# Patient Record
Sex: FEMALE | Race: OTHER | NOT HISPANIC OR LATINO
[De-identification: names, ages, dates, MRNs, and addresses within clinical notes are randomized per-mention and may not be internally consistent; named-entity substitution may affect disease eponyms.]

---

## 2017-01-18 ENCOUNTER — APPOINTMENT (OUTPATIENT)
Dept: ORTHOPEDIC SURGERY | Facility: CLINIC | Age: 57
End: 2017-01-18

## 2017-03-08 ENCOUNTER — APPOINTMENT (OUTPATIENT)
Dept: ORTHOPEDIC SURGERY | Facility: CLINIC | Age: 57
End: 2017-03-08

## 2017-03-08 VITALS
DIASTOLIC BLOOD PRESSURE: 80 MMHG | HEIGHT: 63 IN | HEART RATE: 62 BPM | SYSTOLIC BLOOD PRESSURE: 144 MMHG | BODY MASS INDEX: 34.2 KG/M2 | WEIGHT: 193 LBS

## 2017-03-08 DIAGNOSIS — Z98.890 OTHER SPECIFIED POSTPROCEDURAL STATES: ICD-10-CM

## 2017-04-10 ENCOUNTER — APPOINTMENT (OUTPATIENT)
Dept: INTERNAL MEDICINE | Facility: CLINIC | Age: 57
End: 2017-04-10

## 2017-04-12 ENCOUNTER — APPOINTMENT (OUTPATIENT)
Dept: INTERNAL MEDICINE | Facility: CLINIC | Age: 57
End: 2017-04-12

## 2017-04-12 VITALS
DIASTOLIC BLOOD PRESSURE: 85 MMHG | BODY MASS INDEX: 34.38 KG/M2 | WEIGHT: 194 LBS | SYSTOLIC BLOOD PRESSURE: 134 MMHG | OXYGEN SATURATION: 97 % | HEART RATE: 69 BPM | RESPIRATION RATE: 12 BRPM | HEIGHT: 63 IN | TEMPERATURE: 98.1 F

## 2018-01-23 ENCOUNTER — APPOINTMENT (OUTPATIENT)
Dept: OBGYN | Facility: CLINIC | Age: 58
End: 2018-01-23
Payer: COMMERCIAL

## 2018-01-23 PROCEDURE — 99243 OFF/OP CNSLTJ NEW/EST LOW 30: CPT

## 2018-02-08 ENCOUNTER — OUTPATIENT (OUTPATIENT)
Dept: OUTPATIENT SERVICES | Facility: HOSPITAL | Age: 58
LOS: 1 days | End: 2018-02-08
Payer: COMMERCIAL

## 2018-02-08 VITALS
OXYGEN SATURATION: 96 % | RESPIRATION RATE: 19 BRPM | HEIGHT: 64 IN | SYSTOLIC BLOOD PRESSURE: 120 MMHG | HEART RATE: 65 BPM | TEMPERATURE: 98 F | DIASTOLIC BLOOD PRESSURE: 70 MMHG | WEIGHT: 195.11 LBS

## 2018-02-08 DIAGNOSIS — N60.02 SOLITARY CYST OF LEFT BREAST: Chronic | ICD-10-CM

## 2018-02-08 DIAGNOSIS — N84.0 POLYP OF CORPUS UTERI: ICD-10-CM

## 2018-02-08 DIAGNOSIS — Z98.890 OTHER SPECIFIED POSTPROCEDURAL STATES: Chronic | ICD-10-CM

## 2018-02-08 DIAGNOSIS — N84.1 POLYP OF CERVIX UTERI: ICD-10-CM

## 2018-02-08 DIAGNOSIS — Z01.818 ENCOUNTER FOR OTHER PREPROCEDURAL EXAMINATION: ICD-10-CM

## 2018-02-08 PROCEDURE — 80048 BASIC METABOLIC PNL TOTAL CA: CPT

## 2018-02-08 PROCEDURE — 85027 COMPLETE CBC AUTOMATED: CPT

## 2018-02-08 PROCEDURE — G0463: CPT

## 2018-02-08 RX ORDER — CELECOXIB 200 MG/1
200 CAPSULE ORAL ONCE
Qty: 0 | Refills: 0 | Status: DISCONTINUED | OUTPATIENT
Start: 2018-02-16 | End: 2018-03-03

## 2018-02-08 RX ORDER — ACETAMINOPHEN 500 MG
1000 TABLET ORAL ONCE
Qty: 0 | Refills: 0 | Status: COMPLETED | OUTPATIENT
Start: 2018-02-16 | End: 2018-02-16

## 2018-02-08 RX ORDER — LIDOCAINE HCL 20 MG/ML
0.2 VIAL (ML) INJECTION ONCE
Qty: 0 | Refills: 0 | Status: DISCONTINUED | OUTPATIENT
Start: 2018-02-16 | End: 2018-03-03

## 2018-02-08 RX ORDER — SODIUM CHLORIDE 9 MG/ML
3 INJECTION INTRAMUSCULAR; INTRAVENOUS; SUBCUTANEOUS EVERY 8 HOURS
Qty: 0 | Refills: 0 | Status: DISCONTINUED | OUTPATIENT
Start: 2018-02-16 | End: 2018-03-03

## 2018-02-08 NOTE — H&P PST ADULT - PSH
Breast cyst, left  1987  History of breast biopsy  left, 1996  History of shoulder surgery  Rt Rotator 11/2016

## 2018-02-08 NOTE — H&P PST ADULT - HISTORY OF PRESENT ILLNESS
57yr old female with menses and possible polyp of uterus  coming in for D&C Dx hysteroscopy possible operative  hysteroscopy

## 2018-02-08 NOTE — H&P PST ADULT - NSANTHOSAYNRD_GEN_A_CORE
No. BRAD screening performed.  STOP BANG Legend: 0-2 = LOW Risk; 3-4 = INTERMEDIATE Risk; 5-8 = HIGH Risk

## 2018-02-16 ENCOUNTER — OUTPATIENT (OUTPATIENT)
Dept: OUTPATIENT SERVICES | Facility: HOSPITAL | Age: 58
LOS: 1 days | End: 2018-02-16
Payer: COMMERCIAL

## 2018-02-16 ENCOUNTER — APPOINTMENT (OUTPATIENT)
Dept: OBGYN | Facility: HOSPITAL | Age: 58
End: 2018-02-16

## 2018-02-16 ENCOUNTER — TRANSCRIPTION ENCOUNTER (OUTPATIENT)
Age: 58
End: 2018-02-16

## 2018-02-16 ENCOUNTER — RESULT REVIEW (OUTPATIENT)
Age: 58
End: 2018-02-16

## 2018-02-16 VITALS
OXYGEN SATURATION: 99 % | HEART RATE: 64 BPM | HEIGHT: 64 IN | WEIGHT: 195.11 LBS | TEMPERATURE: 98 F | SYSTOLIC BLOOD PRESSURE: 145 MMHG | DIASTOLIC BLOOD PRESSURE: 86 MMHG | RESPIRATION RATE: 16 BRPM

## 2018-02-16 VITALS
SYSTOLIC BLOOD PRESSURE: 149 MMHG | RESPIRATION RATE: 16 BRPM | HEART RATE: 60 BPM | TEMPERATURE: 97 F | DIASTOLIC BLOOD PRESSURE: 65 MMHG | OXYGEN SATURATION: 98 %

## 2018-02-16 DIAGNOSIS — N60.02 SOLITARY CYST OF LEFT BREAST: Chronic | ICD-10-CM

## 2018-02-16 DIAGNOSIS — N84.0 POLYP OF CORPUS UTERI: ICD-10-CM

## 2018-02-16 DIAGNOSIS — Z98.890 OTHER SPECIFIED POSTPROCEDURAL STATES: Chronic | ICD-10-CM

## 2018-02-16 PROCEDURE — 88305 TISSUE EXAM BY PATHOLOGIST: CPT

## 2018-02-16 PROCEDURE — 88305 TISSUE EXAM BY PATHOLOGIST: CPT | Mod: 26

## 2018-02-16 PROCEDURE — 58558 HYSTEROSCOPY BIOPSY: CPT

## 2018-02-16 PROCEDURE — 58558 HYSTEROSCOPY BIOPSY: CPT | Mod: 80

## 2018-02-16 RX ORDER — SODIUM CHLORIDE 9 MG/ML
1000 INJECTION, SOLUTION INTRAVENOUS
Qty: 0 | Refills: 0 | Status: DISCONTINUED | OUTPATIENT
Start: 2018-02-16 | End: 2018-03-03

## 2018-02-16 RX ORDER — ACETAMINOPHEN 500 MG
2 TABLET ORAL
Qty: 0 | Refills: 0 | COMMUNITY

## 2018-02-16 RX ORDER — OXYCODONE HYDROCHLORIDE 5 MG/1
5 TABLET ORAL ONCE
Qty: 0 | Refills: 0 | Status: DISCONTINUED | OUTPATIENT
Start: 2018-02-16 | End: 2018-02-16

## 2018-02-16 RX ORDER — IBUPROFEN 200 MG
1 TABLET ORAL
Qty: 0 | Refills: 0 | COMMUNITY

## 2018-02-16 RX ORDER — ONDANSETRON 8 MG/1
4 TABLET, FILM COATED ORAL ONCE
Qty: 0 | Refills: 0 | Status: DISCONTINUED | OUTPATIENT
Start: 2018-02-16 | End: 2018-03-03

## 2018-02-16 RX ORDER — AMLODIPINE BESYLATE 2.5 MG/1
7.5 TABLET ORAL
Qty: 0 | Refills: 0 | COMMUNITY

## 2018-02-16 RX ORDER — CELECOXIB 200 MG/1
200 CAPSULE ORAL ONCE
Qty: 0 | Refills: 0 | Status: COMPLETED | OUTPATIENT
Start: 2018-02-16 | End: 2018-02-16

## 2018-02-16 RX ORDER — FAMOTIDINE 10 MG/ML
0 INJECTION INTRAVENOUS
Qty: 0 | Refills: 0 | COMMUNITY

## 2018-02-16 RX ADMIN — Medication 1000 MILLIGRAM(S): at 10:16

## 2018-02-16 RX ADMIN — CELECOXIB 200 MILLIGRAM(S): 200 CAPSULE ORAL at 10:17

## 2018-02-16 NOTE — BRIEF OPERATIVE NOTE - PROCEDURE
<<-----Click on this checkbox to enter Procedure Diagnostic hysteroscopy with dilation and curettage of uterus  02/16/2018    Active  [id number]

## 2018-02-16 NOTE — ASU PATIENT PROFILE, ADULT - VISION (WITH CORRECTIVE LENSES IF THE PATIENT USUALLY WEARS THEM):
Reading glasses left at home/Partially impaired: cannot see medication labels or newsprint, but can see obstacles in path, and the surrounding layout; can count fingers at arm's length

## 2018-02-16 NOTE — ASU DISCHARGE PLAN (ADULT/PEDIATRIC). - NOTIFY
GYN Fever>100.4/Bleeding that does not stop Pain not relieved by Medications/Unable to Urinate/Bleeding that does not stop/GYN Fever>100.4/Persistent Nausea and Vomiting

## 2018-02-16 NOTE — ASU DISCHARGE PLAN (ADULT/PEDIATRIC). - ACTIVITY LEVEL
no exercise/for the next 2 weeks/no intercourse/no tampons/no tub baths/no douching/nothing per vagina

## 2018-02-16 NOTE — ASU PREOP CHECKLIST - TO WHOM
OR RN JACEK Sanchez )/( OR CHRIS Sanchez(1100)/ OR RN JACEK Sanchez(1100)/ given by Loly Epstein, RN

## 2018-02-16 NOTE — ASU DISCHARGE PLAN (ADULT/PEDIATRIC). - MEDICATION SUMMARY - MEDICATIONS TO TAKE
I will START or STAY ON the medications listed below when I get home from the hospital:    Motrin 600 mg oral tablet  -- 1 tab(s) by mouth every 6 hours  -- Indication: For for pain/cramping, as needed    Tylenol 325 mg oral tablet  -- 2 tab(s) by mouth every 4 hours  -- Indication: For for pain/cramping, as needed    amLODIPine  -- 7.5 milligram(s) by mouth once a day  -- Indication: For home med    multivitamin  --     -- Indication: For home med    Vitamin D3  --  by mouth   -- Indication: For home med

## 2018-02-20 LAB — SURGICAL PATHOLOGY STUDY: SIGNIFICANT CHANGE UP

## 2018-02-27 ENCOUNTER — APPOINTMENT (OUTPATIENT)
Dept: OBGYN | Facility: CLINIC | Age: 58
End: 2018-02-27
Payer: COMMERCIAL

## 2018-02-27 PROCEDURE — 99024 POSTOP FOLLOW-UP VISIT: CPT

## 2018-05-09 ENCOUNTER — APPOINTMENT (OUTPATIENT)
Dept: INTERNAL MEDICINE | Facility: CLINIC | Age: 58
End: 2018-05-09
Payer: COMMERCIAL

## 2018-05-09 VITALS
RESPIRATION RATE: 12 BRPM | OXYGEN SATURATION: 97 % | DIASTOLIC BLOOD PRESSURE: 76 MMHG | HEART RATE: 70 BPM | TEMPERATURE: 98.1 F | BODY MASS INDEX: 35.26 KG/M2 | WEIGHT: 199 LBS | HEIGHT: 63 IN | SYSTOLIC BLOOD PRESSURE: 152 MMHG

## 2018-05-09 VITALS — DIASTOLIC BLOOD PRESSURE: 82 MMHG | SYSTOLIC BLOOD PRESSURE: 148 MMHG

## 2018-05-09 PROCEDURE — 99396 PREV VISIT EST AGE 40-64: CPT

## 2018-05-14 LAB
25(OH)D3 SERPL-MCNC: 33 NG/ML
ALBUMIN SERPL ELPH-MCNC: 4.3 G/DL
ALP BLD-CCNC: 55 U/L
ALT SERPL-CCNC: 11 U/L
ANION GAP SERPL CALC-SCNC: 15 MMOL/L
APPEARANCE: CLEAR
AST SERPL-CCNC: 17 U/L
BASOPHILS # BLD AUTO: 0.05 K/UL
BASOPHILS NFR BLD AUTO: 0.6 %
BILIRUB SERPL-MCNC: 0.4 MG/DL
BILIRUBIN URINE: NEGATIVE
BLOOD URINE: NEGATIVE
BUN SERPL-MCNC: 14 MG/DL
CALCIUM SERPL-MCNC: 9.3 MG/DL
CHLORIDE SERPL-SCNC: 100 MMOL/L
CO2 SERPL-SCNC: 22 MMOL/L
COLOR: YELLOW
CREAT SERPL-MCNC: 0.64 MG/DL
EOSINOPHIL # BLD AUTO: 0.27 K/UL
EOSINOPHIL NFR BLD AUTO: 3.1 %
GLUCOSE QUALITATIVE U: NEGATIVE MG/DL
GLUCOSE SERPL-MCNC: 74 MG/DL
HBA1C MFR BLD HPLC: 5.6 %
HCT VFR BLD CALC: 40.1 %
HGB BLD-MCNC: 12.9 G/DL
IMM GRANULOCYTES NFR BLD AUTO: 0.1 %
KETONES URINE: NEGATIVE
LEUKOCYTE ESTERASE URINE: NEGATIVE
LYMPHOCYTES # BLD AUTO: 1.95 K/UL
LYMPHOCYTES NFR BLD AUTO: 22.4 %
MAN DIFF?: NORMAL
MCHC RBC-ENTMCNC: 29.7 PG
MCHC RBC-ENTMCNC: 32.2 GM/DL
MCV RBC AUTO: 92.4 FL
MONOCYTES # BLD AUTO: 0.94 K/UL
MONOCYTES NFR BLD AUTO: 10.8 %
NEUTROPHILS # BLD AUTO: 5.5 K/UL
NEUTROPHILS NFR BLD AUTO: 63 %
NITRITE URINE: NEGATIVE
PH URINE: 5
PLATELET # BLD AUTO: 361 K/UL
POTASSIUM SERPL-SCNC: 3.9 MMOL/L
PROT SERPL-MCNC: 7.7 G/DL
PROTEIN URINE: NEGATIVE MG/DL
RBC # BLD: 4.34 M/UL
RBC # FLD: 15.1 %
SODIUM SERPL-SCNC: 137 MMOL/L
SPECIFIC GRAVITY URINE: 1.02
TSH SERPL-ACNC: 2.82 UIU/ML
UROBILINOGEN URINE: NEGATIVE MG/DL
VIT B12 SERPL-MCNC: 555 PG/ML
WBC # FLD AUTO: 8.72 K/UL

## 2018-05-21 LAB
CHOLEST SERPL-MCNC: 232 MG/DL
CHOLEST/HDLC SERPL: 3.6 RATIO
HDLC SERPL-MCNC: 64 MG/DL
LDLC SERPL CALC-MCNC: 150 MG/DL
TRIGL SERPL-MCNC: 91 MG/DL

## 2018-08-02 ENCOUNTER — RX RENEWAL (OUTPATIENT)
Age: 58
End: 2018-08-02

## 2018-09-04 ENCOUNTER — RESULT REVIEW (OUTPATIENT)
Age: 58
End: 2018-09-04

## 2018-09-04 ENCOUNTER — APPOINTMENT (OUTPATIENT)
Dept: OBGYN | Facility: CLINIC | Age: 58
End: 2018-09-04
Payer: COMMERCIAL

## 2018-09-04 PROCEDURE — 99214 OFFICE O/P EST MOD 30 MIN: CPT

## 2018-09-10 ENCOUNTER — APPOINTMENT (OUTPATIENT)
Dept: ULTRASOUND IMAGING | Facility: CLINIC | Age: 58
End: 2018-09-10
Payer: COMMERCIAL

## 2018-09-10 ENCOUNTER — OUTPATIENT (OUTPATIENT)
Dept: OUTPATIENT SERVICES | Facility: HOSPITAL | Age: 58
LOS: 1 days | End: 2018-09-10
Payer: COMMERCIAL

## 2018-09-10 DIAGNOSIS — Z98.890 OTHER SPECIFIED POSTPROCEDURAL STATES: Chronic | ICD-10-CM

## 2018-09-10 DIAGNOSIS — N60.02 SOLITARY CYST OF LEFT BREAST: Chronic | ICD-10-CM

## 2018-09-10 DIAGNOSIS — Z00.8 ENCOUNTER FOR OTHER GENERAL EXAMINATION: ICD-10-CM

## 2018-09-10 PROCEDURE — 76856 US EXAM PELVIC COMPLETE: CPT

## 2018-09-10 PROCEDURE — 76856 US EXAM PELVIC COMPLETE: CPT | Mod: 26,59

## 2018-09-10 PROCEDURE — 76830 TRANSVAGINAL US NON-OB: CPT | Mod: 26

## 2018-09-10 PROCEDURE — 76830 TRANSVAGINAL US NON-OB: CPT

## 2018-10-08 ENCOUNTER — RESULT REVIEW (OUTPATIENT)
Age: 58
End: 2018-10-08

## 2018-10-09 ENCOUNTER — APPOINTMENT (OUTPATIENT)
Dept: OBGYN | Facility: CLINIC | Age: 58
End: 2018-10-09
Payer: COMMERCIAL

## 2018-10-09 PROCEDURE — 58100 BIOPSY OF UTERUS LINING: CPT

## 2019-01-29 ENCOUNTER — APPOINTMENT (OUTPATIENT)
Dept: INTERNAL MEDICINE | Facility: CLINIC | Age: 59
End: 2019-01-29
Payer: COMMERCIAL

## 2019-01-29 VITALS
RESPIRATION RATE: 12 BRPM | DIASTOLIC BLOOD PRESSURE: 80 MMHG | SYSTOLIC BLOOD PRESSURE: 146 MMHG | HEIGHT: 63 IN | BODY MASS INDEX: 30.48 KG/M2 | WEIGHT: 172 LBS | HEART RATE: 65 BPM | OXYGEN SATURATION: 99 % | TEMPERATURE: 98.8 F

## 2019-01-29 VITALS — DIASTOLIC BLOOD PRESSURE: 82 MMHG | SYSTOLIC BLOOD PRESSURE: 130 MMHG

## 2019-01-29 DIAGNOSIS — E78.00 PURE HYPERCHOLESTEROLEMIA, UNSPECIFIED: ICD-10-CM

## 2019-01-29 DIAGNOSIS — M95.8 OTHER SPECIFIED ACQUIRED DEFORMITIES OF MUSCULOSKELETAL SYSTEM: ICD-10-CM

## 2019-01-29 PROCEDURE — 99214 OFFICE O/P EST MOD 30 MIN: CPT

## 2019-02-06 LAB
CHOLEST SERPL-MCNC: 209 MG/DL
CHOLEST/HDLC SERPL: 4.4 RATIO
HDLC SERPL-MCNC: 48 MG/DL
LDLC SERPL CALC-MCNC: 137 MG/DL
TRIGL SERPL-MCNC: 118 MG/DL

## 2019-02-18 NOTE — PHYSICAL EXAM
[No Acute Distress] : no acute distress [Well Nourished] : well nourished [Well Developed] : well developed [Well-Appearing] : well-appearing [Normal Sclera/Conjunctiva] : normal sclera/conjunctiva [Normal Outer Ear/Nose] : the outer ears and nose were normal in appearance [No JVD] : no jugular venous distention [No Respiratory Distress] : no respiratory distress  [Clear to Auscultation] : lungs were clear to auscultation bilaterally [No Accessory Muscle Use] : no accessory muscle use [Normal Rate] : normal rate  [Regular Rhythm] : with a regular rhythm [Normal S1, S2] : normal S1 and S2 [No Murmur] : no murmur heard [No Edema] : there was no peripheral edema [Soft] : abdomen soft [Non Tender] : non-tender [Non-distended] : non-distended [Normal Anterior Cervical Nodes] : no anterior cervical lymphadenopathy [No CVA Tenderness] : no CVA  tenderness [No Joint Swelling] : no joint swelling [No Rash] : no rash [Normal Gait] : normal gait [Normal Insight/Judgement] : insight and judgment were intact [de-identified] : there is a protrusion at the sternoclavicular joint and the right clavicle towards the shoulder is protruding more then the left

## 2019-02-18 NOTE — HISTORY OF PRESENT ILLNESS
[de-identified] : 57 yo female, accompannied by her , with hx of HTN, elevated cholesterol, presents stating that she has a lump sticking out in her upper right chest. Pt reports having it for about a year. denies trauma to the area. Pt had rt shoulder surgery 2016\par Pt still has periods and has been following with gyn because she had some irregular periods. She is on Norethidrone 5mg

## 2019-02-18 NOTE — ASSESSMENT
[FreeTextEntry1] : rt clavicle and rt sternoclavicula joint area prtrusion\par referred for x-ray of rt clavicle/sternoclavicular joint\par \par HTN\par cont current meds\par \par elevated cholesterol\par cont low chol/low fat diet\par lipid panel ordered\par \par f/u in one week for lab results\par

## 2019-02-28 ENCOUNTER — APPOINTMENT (OUTPATIENT)
Dept: ULTRASOUND IMAGING | Facility: CLINIC | Age: 59
End: 2019-02-28
Payer: COMMERCIAL

## 2019-02-28 ENCOUNTER — OUTPATIENT (OUTPATIENT)
Dept: OUTPATIENT SERVICES | Facility: HOSPITAL | Age: 59
LOS: 1 days | End: 2019-02-28
Payer: COMMERCIAL

## 2019-02-28 DIAGNOSIS — Z98.890 OTHER SPECIFIED POSTPROCEDURAL STATES: Chronic | ICD-10-CM

## 2019-02-28 DIAGNOSIS — N95.1 MENOPAUSAL AND FEMALE CLIMACTERIC STATES: ICD-10-CM

## 2019-02-28 DIAGNOSIS — N60.02 SOLITARY CYST OF LEFT BREAST: Chronic | ICD-10-CM

## 2019-02-28 PROCEDURE — 76830 TRANSVAGINAL US NON-OB: CPT

## 2019-02-28 PROCEDURE — 76856 US EXAM PELVIC COMPLETE: CPT | Mod: 26,59

## 2019-02-28 PROCEDURE — 76856 US EXAM PELVIC COMPLETE: CPT

## 2019-02-28 PROCEDURE — 76830 TRANSVAGINAL US NON-OB: CPT | Mod: 26

## 2019-05-08 ENCOUNTER — APPOINTMENT (OUTPATIENT)
Dept: ULTRASOUND IMAGING | Facility: CLINIC | Age: 59
End: 2019-05-08
Payer: COMMERCIAL

## 2019-05-08 ENCOUNTER — OUTPATIENT (OUTPATIENT)
Dept: OUTPATIENT SERVICES | Facility: HOSPITAL | Age: 59
LOS: 1 days | End: 2019-05-08
Payer: COMMERCIAL

## 2019-05-08 DIAGNOSIS — N60.02 SOLITARY CYST OF LEFT BREAST: Chronic | ICD-10-CM

## 2019-05-08 DIAGNOSIS — Z00.8 ENCOUNTER FOR OTHER GENERAL EXAMINATION: ICD-10-CM

## 2019-05-08 DIAGNOSIS — Z98.890 OTHER SPECIFIED POSTPROCEDURAL STATES: Chronic | ICD-10-CM

## 2019-05-08 PROCEDURE — 76856 US EXAM PELVIC COMPLETE: CPT | Mod: 26,59

## 2019-05-08 PROCEDURE — 76830 TRANSVAGINAL US NON-OB: CPT

## 2019-05-08 PROCEDURE — 76856 US EXAM PELVIC COMPLETE: CPT

## 2019-05-08 PROCEDURE — 76830 TRANSVAGINAL US NON-OB: CPT | Mod: 26

## 2019-05-17 ENCOUNTER — APPOINTMENT (OUTPATIENT)
Dept: DERMATOLOGY | Facility: CLINIC | Age: 59
End: 2019-05-17
Payer: COMMERCIAL

## 2019-05-17 VITALS
HEIGHT: 64 IN | DIASTOLIC BLOOD PRESSURE: 84 MMHG | BODY MASS INDEX: 30.73 KG/M2 | WEIGHT: 180 LBS | SYSTOLIC BLOOD PRESSURE: 128 MMHG

## 2019-05-17 DIAGNOSIS — Z84.0 FAMILY HISTORY OF DISEASES OF THE SKIN AND SUBCUTANEOUS TISSUE: ICD-10-CM

## 2019-05-17 DIAGNOSIS — C44.622 SQUAMOUS CELL CARCINOMA OF SKIN OF RIGHT UPPER LIMB, INCLUDING SHOULDER: ICD-10-CM

## 2019-05-17 DIAGNOSIS — Z86.79 PERSONAL HISTORY OF OTHER DISEASES OF THE CIRCULATORY SYSTEM: ICD-10-CM

## 2019-05-17 DIAGNOSIS — Z91.89 OTHER SPECIFIED PERSONAL RISK FACTORS, NOT ELSEWHERE CLASSIFIED: ICD-10-CM

## 2019-05-17 PROCEDURE — 99203 OFFICE O/P NEW LOW 30 MIN: CPT

## 2019-05-22 ENCOUNTER — APPOINTMENT (OUTPATIENT)
Dept: INTERNAL MEDICINE | Facility: CLINIC | Age: 59
End: 2019-05-22
Payer: COMMERCIAL

## 2019-05-22 VITALS
TEMPERATURE: 98.8 F | WEIGHT: 183 LBS | HEART RATE: 66 BPM | DIASTOLIC BLOOD PRESSURE: 71 MMHG | OXYGEN SATURATION: 98 % | HEIGHT: 64 IN | RESPIRATION RATE: 12 BRPM | BODY MASS INDEX: 31.24 KG/M2 | SYSTOLIC BLOOD PRESSURE: 143 MMHG

## 2019-05-22 PROCEDURE — 99396 PREV VISIT EST AGE 40-64: CPT

## 2019-05-28 LAB
25(OH)D3 SERPL-MCNC: 36.1 NG/ML
ALBUMIN SERPL ELPH-MCNC: 4.7 G/DL
ALP BLD-CCNC: 67 U/L
ALT SERPL-CCNC: 14 U/L
ANION GAP SERPL CALC-SCNC: 14 MMOL/L
APPEARANCE: CLEAR
AST SERPL-CCNC: 13 U/L
BASOPHILS # BLD AUTO: 0.07 K/UL
BASOPHILS NFR BLD AUTO: 0.9 %
BILIRUB SERPL-MCNC: 0.5 MG/DL
BILIRUBIN URINE: NEGATIVE
BLOOD URINE: NEGATIVE
BUN SERPL-MCNC: 15 MG/DL
CALCIUM SERPL-MCNC: 9.8 MG/DL
CHLORIDE SERPL-SCNC: 103 MMOL/L
CO2 SERPL-SCNC: 21 MMOL/L
COLOR: NORMAL
CREAT SERPL-MCNC: 0.6 MG/DL
EOSINOPHIL # BLD AUTO: 0.24 K/UL
EOSINOPHIL NFR BLD AUTO: 3.2 %
ESTIMATED AVERAGE GLUCOSE: 114 MG/DL
GLUCOSE QUALITATIVE U: NEGATIVE
GLUCOSE SERPL-MCNC: 99 MG/DL
HBA1C MFR BLD HPLC: 5.6 %
HCT VFR BLD CALC: 43.7 %
HGB BLD-MCNC: 13.7 G/DL
IMM GRANULOCYTES NFR BLD AUTO: 0.3 %
KETONES URINE: NEGATIVE
LEUKOCYTE ESTERASE URINE: NEGATIVE
LYMPHOCYTES # BLD AUTO: 1.91 K/UL
LYMPHOCYTES NFR BLD AUTO: 25.7 %
MAN DIFF?: NORMAL
MCHC RBC-ENTMCNC: 29.2 PG
MCHC RBC-ENTMCNC: 31.4 GM/DL
MCV RBC AUTO: 93.2 FL
MONOCYTES # BLD AUTO: 0.75 K/UL
MONOCYTES NFR BLD AUTO: 10.1 %
NEUTROPHILS # BLD AUTO: 4.44 K/UL
NEUTROPHILS NFR BLD AUTO: 59.8 %
NITRITE URINE: NEGATIVE
PH URINE: 5
PLATELET # BLD AUTO: 389 K/UL
POTASSIUM SERPL-SCNC: 4.2 MMOL/L
PROT SERPL-MCNC: 7.3 G/DL
PROTEIN URINE: NEGATIVE
RBC # BLD: 4.69 M/UL
RBC # FLD: 14 %
SODIUM SERPL-SCNC: 138 MMOL/L
SPECIFIC GRAVITY URINE: 1.01
TSH SERPL-ACNC: 2.24 UIU/ML
UROBILINOGEN URINE: NORMAL
VIT B12 SERPL-MCNC: 569 PG/ML
WBC # FLD AUTO: 7.43 K/UL

## 2019-06-07 LAB
CHOLEST SERPL-MCNC: 256 MG/DL
CHOLEST/HDLC SERPL: 3.7 RATIO
HDLC SERPL-MCNC: 69 MG/DL
LDLC SERPL CALC-MCNC: 162 MG/DL
TRIGL SERPL-MCNC: 126 MG/DL

## 2019-06-09 NOTE — ASSESSMENT
[FreeTextEntry1] : Physical\par blood work ordered\par She is UTD with her PAP/mammography/Bone density scan/colonoscopy\par \par Hx of HTN\par cont current meds\par reinforced low salt diet, weight loss, exercise\par \par F/U in one week for lab results

## 2019-06-09 NOTE — HEALTH RISK ASSESSMENT
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

## 2019-06-09 NOTE — HISTORY OF PRESENT ILLNESS
[Spouse] : spouse [de-identified] : 58 y.o. female, with PMHx of HTN, presents for annual physical\par She had seen dermatology, had bx on a lesion in her rt forearm which was + for squamous cell and is going back next week for removal of the lesion

## 2019-08-07 ENCOUNTER — APPOINTMENT (OUTPATIENT)
Dept: ULTRASOUND IMAGING | Facility: CLINIC | Age: 59
End: 2019-08-07
Payer: COMMERCIAL

## 2019-08-07 ENCOUNTER — OUTPATIENT (OUTPATIENT)
Dept: OUTPATIENT SERVICES | Facility: HOSPITAL | Age: 59
LOS: 1 days | End: 2019-08-07
Payer: COMMERCIAL

## 2019-08-07 DIAGNOSIS — Z98.890 OTHER SPECIFIED POSTPROCEDURAL STATES: Chronic | ICD-10-CM

## 2019-08-07 DIAGNOSIS — Z00.8 ENCOUNTER FOR OTHER GENERAL EXAMINATION: ICD-10-CM

## 2019-08-07 DIAGNOSIS — N60.02 SOLITARY CYST OF LEFT BREAST: Chronic | ICD-10-CM

## 2019-08-07 PROCEDURE — 76856 US EXAM PELVIC COMPLETE: CPT | Mod: 26,59

## 2019-08-07 PROCEDURE — 76856 US EXAM PELVIC COMPLETE: CPT

## 2019-08-07 PROCEDURE — 76830 TRANSVAGINAL US NON-OB: CPT

## 2019-08-07 PROCEDURE — 76830 TRANSVAGINAL US NON-OB: CPT | Mod: 26

## 2019-08-13 LAB
CHOLEST SERPL-MCNC: 212 MG/DL
CHOLEST/HDLC SERPL: 3.9 RATIO
HDLC SERPL-MCNC: 54 MG/DL
LDLC SERPL CALC-MCNC: 136 MG/DL
TRIGL SERPL-MCNC: 109 MG/DL

## 2020-05-06 ENCOUNTER — RX RENEWAL (OUTPATIENT)
Age: 60
End: 2020-05-06

## 2020-07-31 ENCOUNTER — LABORATORY RESULT (OUTPATIENT)
Age: 60
End: 2020-07-31

## 2020-07-31 ENCOUNTER — APPOINTMENT (OUTPATIENT)
Dept: INTERNAL MEDICINE | Facility: CLINIC | Age: 60
End: 2020-07-31
Payer: COMMERCIAL

## 2020-07-31 VITALS
SYSTOLIC BLOOD PRESSURE: 160 MMHG | DIASTOLIC BLOOD PRESSURE: 79 MMHG | WEIGHT: 203 LBS | RESPIRATION RATE: 14 BRPM | BODY MASS INDEX: 34.85 KG/M2 | HEART RATE: 67 BPM | OXYGEN SATURATION: 96 % | TEMPERATURE: 98.7 F

## 2020-07-31 VITALS — SYSTOLIC BLOOD PRESSURE: 140 MMHG | DIASTOLIC BLOOD PRESSURE: 90 MMHG

## 2020-07-31 PROCEDURE — 99396 PREV VISIT EST AGE 40-64: CPT

## 2020-07-31 RX ORDER — NORETHINDRONE ACETATE 5 MG/1
5 TABLET ORAL
Refills: 0 | Status: DISCONTINUED | COMMUNITY
End: 2020-07-31

## 2020-07-31 RX ORDER — ATORVASTATIN CALCIUM 10 MG/1
10 TABLET, FILM COATED ORAL
Qty: 30 | Refills: 1 | Status: DISCONTINUED | COMMUNITY
Start: 2019-05-29 | End: 2020-07-31

## 2020-07-31 NOTE — HISTORY OF PRESENT ILLNESS
[de-identified] : Ms. ANGELO BARCENAS is a 59 year old female, accomapnied by her , with hx of HTN,  presents for annual physical\par She is doing well. \par Denies SOB, chest pain, abdominal pain, N/V/D, leg swelling, headache, dizziness \par Offers no complaints\par

## 2020-07-31 NOTE — HEALTH RISK ASSESSMENT
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

## 2020-07-31 NOTE — ASSESSMENT
[FreeTextEntry1] : Physical\par She is UTD with her PAP, colonoscopy, mammography ( has simba't for her mammography in December )\par Has referral for Bone Density scan from her gyn and has simba't in December\par blood work ordered\par \par HTN\par borderline BP today-? secondary to pt being nervous\par cont amlodipine 7.5mg daily-renewed today\par low salt diet\par weight loss, exercise\par \par follow up in one week for BP check and lab results\par

## 2020-08-03 LAB
25(OH)D3 SERPL-MCNC: 37 NG/ML
ALBUMIN SERPL ELPH-MCNC: 4.9 G/DL
ALP BLD-CCNC: 79 U/L
ALT SERPL-CCNC: 18 U/L
ANION GAP SERPL CALC-SCNC: 18 MMOL/L
APPEARANCE: CLEAR
AST SERPL-CCNC: 18 U/L
BASOPHILS # BLD AUTO: 0.07 K/UL
BASOPHILS NFR BLD AUTO: 1 %
BILIRUB SERPL-MCNC: 0.4 MG/DL
BILIRUBIN URINE: NEGATIVE
BLOOD URINE: NEGATIVE
BUN SERPL-MCNC: 11 MG/DL
CALCIUM SERPL-MCNC: 10.1 MG/DL
CHLORIDE SERPL-SCNC: 98 MMOL/L
CO2 SERPL-SCNC: 23 MMOL/L
COLOR: NORMAL
CREAT SERPL-MCNC: 0.67 MG/DL
EOSINOPHIL # BLD AUTO: 0.3 K/UL
EOSINOPHIL NFR BLD AUTO: 4.4 %
ESTIMATED AVERAGE GLUCOSE: 117 MG/DL
GLUCOSE QUALITATIVE U: NEGATIVE
GLUCOSE SERPL-MCNC: 92 MG/DL
HBA1C MFR BLD HPLC: 5.7 %
HCT VFR BLD CALC: 42.5 %
HGB BLD-MCNC: 14.1 G/DL
IMM GRANULOCYTES NFR BLD AUTO: 0.1 %
KETONES URINE: NEGATIVE
LEUKOCYTE ESTERASE URINE: ABNORMAL
LYMPHOCYTES # BLD AUTO: 1.85 K/UL
LYMPHOCYTES NFR BLD AUTO: 27.2 %
MAN DIFF?: NORMAL
MCHC RBC-ENTMCNC: 30 PG
MCHC RBC-ENTMCNC: 33.2 GM/DL
MCV RBC AUTO: 90.4 FL
MONOCYTES # BLD AUTO: 0.78 K/UL
MONOCYTES NFR BLD AUTO: 11.5 %
NEUTROPHILS # BLD AUTO: 3.78 K/UL
NEUTROPHILS NFR BLD AUTO: 55.8 %
NITRITE URINE: NEGATIVE
PH URINE: 5
PLATELET # BLD AUTO: 337 K/UL
POTASSIUM SERPL-SCNC: 4.4 MMOL/L
PROT SERPL-MCNC: 7.3 G/DL
PROTEIN URINE: NEGATIVE
RBC # BLD: 4.7 M/UL
RBC # FLD: 12.9 %
SARS-COV-2 IGG SERPL IA-ACNC: <0.1 INDEX
SARS-COV-2 IGG SERPL QL IA: NEGATIVE
SODIUM SERPL-SCNC: 140 MMOL/L
SPECIFIC GRAVITY URINE: 1.01
TSH SERPL-ACNC: 2.24 UIU/ML
UROBILINOGEN URINE: NORMAL
VIT B12 SERPL-MCNC: 445 PG/ML
WBC # FLD AUTO: 6.79 K/UL

## 2020-08-10 LAB
CHOLEST SERPL-MCNC: 288 MG/DL
CHOLEST/HDLC SERPL: 4.9 RATIO
HDLC SERPL-MCNC: 59 MG/DL
LDLC SERPL CALC-MCNC: 197 MG/DL
TRIGL SERPL-MCNC: 161 MG/DL

## 2020-08-12 LAB — BACTERIA UR CULT: NORMAL

## 2020-08-14 LAB
APPEARANCE: CLEAR
BACTERIA: NEGATIVE
BILIRUBIN URINE: NEGATIVE
BLOOD URINE: NEGATIVE
COLOR: NORMAL
GLUCOSE QUALITATIVE U: NEGATIVE
HYALINE CASTS: 0 /LPF
KETONES URINE: NEGATIVE
LEUKOCYTE ESTERASE URINE: NEGATIVE
MICROSCOPIC-UA: NORMAL
NITRITE URINE: NEGATIVE
PH URINE: 5
PROTEIN URINE: NEGATIVE
RED BLOOD CELLS URINE: 2 /HPF
SPECIFIC GRAVITY URINE: 1.01
SQUAMOUS EPITHELIAL CELLS: 1 /HPF
UROBILINOGEN URINE: NORMAL
WHITE BLOOD CELLS URINE: 0 /HPF

## 2020-10-29 ENCOUNTER — APPOINTMENT (OUTPATIENT)
Dept: INTERNAL MEDICINE | Facility: CLINIC | Age: 60
End: 2020-10-29
Payer: COMMERCIAL

## 2020-10-29 VITALS
HEART RATE: 64 BPM | WEIGHT: 191 LBS | RESPIRATION RATE: 14 BRPM | SYSTOLIC BLOOD PRESSURE: 148 MMHG | OXYGEN SATURATION: 99 % | TEMPERATURE: 97.8 F | DIASTOLIC BLOOD PRESSURE: 88 MMHG | HEIGHT: 64 IN | BODY MASS INDEX: 32.61 KG/M2

## 2020-10-29 VITALS — SYSTOLIC BLOOD PRESSURE: 160 MMHG | DIASTOLIC BLOOD PRESSURE: 90 MMHG

## 2020-10-29 PROCEDURE — 99072 ADDL SUPL MATRL&STAF TM PHE: CPT

## 2020-10-29 PROCEDURE — 99214 OFFICE O/P EST MOD 30 MIN: CPT

## 2020-10-29 NOTE — HISTORY OF PRESENT ILLNESS
[de-identified] : Ms. ANGELO BARCENAS is a 59 year old female, with hx of HTN, hypercholesterolemia, presents for follow up visit and blood work\par She was started on Lipitor 10mg in early August. Pt reports compliance with taking it daily and is following a low cholesterol/ low fat diet\par She is doing well. \par Denies SOB, chest pain, abdominal pain, N/V/D, leg swelling, headache, dizziness \par Offers no complaints\par

## 2020-10-29 NOTE — ASSESSMENT
[FreeTextEntry1] : HLD\par cont atorvastatin 10mg po daily\par cont low chol/low fat diet\par \par HTN\par pt with elevated BP today\par increase amlodipine to 10mg daily-Rx sent to pharmacy\par \par blood work ordered\par \par follow up in one week for BP check and lab results\par \par  Dr. Magaan contacted in regards to patients temperature. Will place order for OK tylenol

## 2020-11-03 LAB
ALBUMIN SERPL ELPH-MCNC: 4.9 G/DL
ALP BLD-CCNC: 89 U/L
ALT SERPL-CCNC: 23 U/L
ANION GAP SERPL CALC-SCNC: 16 MMOL/L
AST SERPL-CCNC: 17 U/L
BILIRUB SERPL-MCNC: 0.5 MG/DL
BUN SERPL-MCNC: 10 MG/DL
CALCIUM SERPL-MCNC: 10.2 MG/DL
CHLORIDE SERPL-SCNC: 99 MMOL/L
CHOLEST SERPL-MCNC: 171 MG/DL
CO2 SERPL-SCNC: 25 MMOL/L
CREAT SERPL-MCNC: 0.69 MG/DL
GLUCOSE SERPL-MCNC: 83 MG/DL
HDLC SERPL-MCNC: 61 MG/DL
LDLC SERPL CALC-MCNC: 92 MG/DL
NONHDLC SERPL-MCNC: 110 MG/DL
POTASSIUM SERPL-SCNC: 4.1 MMOL/L
PROT SERPL-MCNC: 7.3 G/DL
SODIUM SERPL-SCNC: 140 MMOL/L
TRIGL SERPL-MCNC: 89 MG/DL

## 2020-11-06 LAB
ESTIMATED AVERAGE GLUCOSE: 120 MG/DL
HBA1C MFR BLD HPLC: 5.8 %

## 2020-11-12 ENCOUNTER — APPOINTMENT (OUTPATIENT)
Dept: INTERNAL MEDICINE | Facility: CLINIC | Age: 60
End: 2020-11-12
Payer: COMMERCIAL

## 2020-11-12 VITALS
HEART RATE: 69 BPM | RESPIRATION RATE: 14 BRPM | OXYGEN SATURATION: 98 % | BODY MASS INDEX: 32.79 KG/M2 | SYSTOLIC BLOOD PRESSURE: 153 MMHG | TEMPERATURE: 97.7 F | WEIGHT: 191 LBS | DIASTOLIC BLOOD PRESSURE: 81 MMHG

## 2020-11-12 VITALS — DIASTOLIC BLOOD PRESSURE: 80 MMHG | SYSTOLIC BLOOD PRESSURE: 130 MMHG

## 2020-11-12 DIAGNOSIS — R22.1 LOCALIZED SWELLING, MASS AND LUMP, NECK: ICD-10-CM

## 2020-11-12 PROCEDURE — 99214 OFFICE O/P EST MOD 30 MIN: CPT

## 2020-11-12 PROCEDURE — 99072 ADDL SUPL MATRL&STAF TM PHE: CPT

## 2020-11-12 NOTE — HISTORY OF PRESENT ILLNESS
[de-identified] : Ms. ANGELO BARCENAS is a 59 year old female, with hx of HTN, hypercholesterolemia, presents for follow up visit \par Amlodipine was increased to 10mg  on her last visit\par She is also on Lipitor 10mg since early August. Pt reports compliance with taking both of her meds daily and is following a low cholesterol/ low fat diet\par Pt says she feels "something like mucous" is stuck in her throat for few months\par Denies SOB, chest pain, abdominal pain, N/V/D, leg swelling, headache, dizziness \par \par

## 2020-11-12 NOTE — ASSESSMENT
[FreeTextEntry1] : HTN, HLD\par cont current meds\par blood work ordered\par \par feeling of something in throat\par referred to ENT\par \par follow up in one week for lab results\par

## 2020-11-12 NOTE — PHYSICAL EXAM
[No Acute Distress] : no acute distress [Well Nourished] : well nourished [Well Developed] : well developed [Well-Appearing] : well-appearing [Normal Sclera/Conjunctiva] : normal sclera/conjunctiva [PERRL] : pupils equal round and reactive to light [EOMI] : extraocular movements intact [Normal Outer Ear/Nose] : the outer ears and nose were normal in appearance [Normal Oropharynx] : the oropharynx was normal [No JVD] : no jugular venous distention [No Lymphadenopathy] : no lymphadenopathy [Supple] : supple [No Respiratory Distress] : no respiratory distress  [No Accessory Muscle Use] : no accessory muscle use [Clear to Auscultation] : lungs were clear to auscultation bilaterally [Normal Rate] : normal rate  [Regular Rhythm] : with a regular rhythm [Normal S1, S2] : normal S1 and S2 [No Murmur] : no murmur heard [Pedal Pulses Present] : the pedal pulses are present [No Edema] : there was no peripheral edema [No Extremity Clubbing/Cyanosis] : no extremity clubbing/cyanosis [Soft] : abdomen soft [Non Tender] : non-tender [Non-distended] : non-distended [Normal Bowel Sounds] : normal bowel sounds [Normal Posterior Cervical Nodes] : no posterior cervical lymphadenopathy [Normal Anterior Cervical Nodes] : no anterior cervical lymphadenopathy [No CVA Tenderness] : no CVA  tenderness [No Spinal Tenderness] : no spinal tenderness [No Joint Swelling] : no joint swelling [Grossly Normal Strength/Tone] : grossly normal strength/tone [No Rash] : no rash [Coordination Grossly Intact] : coordination grossly intact [No Focal Deficits] : no focal deficits [Normal Gait] : normal gait [Speech Grossly Normal] : speech grossly normal [Normal Affect] : the affect was normal [Alert and Oriented x3] : oriented to person, place, and time [Normal Insight/Judgement] : insight and judgment were intact

## 2020-11-17 LAB
ALBUMIN SERPL ELPH-MCNC: 4.7 G/DL
ALP BLD-CCNC: 86 U/L
ALT SERPL-CCNC: 16 U/L
AST SERPL-CCNC: 15 U/L
BILIRUB DIRECT SERPL-MCNC: 0.1 MG/DL
BILIRUB INDIRECT SERPL-MCNC: 0.2 MG/DL
BILIRUB SERPL-MCNC: 0.4 MG/DL
PROT SERPL-MCNC: 7.2 G/DL

## 2021-02-18 ENCOUNTER — APPOINTMENT (OUTPATIENT)
Dept: INTERNAL MEDICINE | Facility: CLINIC | Age: 61
End: 2021-02-18
Payer: COMMERCIAL

## 2021-02-18 VITALS
DIASTOLIC BLOOD PRESSURE: 81 MMHG | HEART RATE: 67 BPM | OXYGEN SATURATION: 97 % | HEIGHT: 64 IN | BODY MASS INDEX: 33.29 KG/M2 | RESPIRATION RATE: 12 BRPM | WEIGHT: 195 LBS | SYSTOLIC BLOOD PRESSURE: 128 MMHG | TEMPERATURE: 97.5 F

## 2021-02-18 PROCEDURE — 99214 OFFICE O/P EST MOD 30 MIN: CPT

## 2021-02-18 PROCEDURE — 99072 ADDL SUPL MATRL&STAF TM PHE: CPT

## 2021-02-18 RX ORDER — ATORVASTATIN CALCIUM 10 MG/1
10 TABLET, FILM COATED ORAL
Qty: 90 | Refills: 1 | Status: ACTIVE | COMMUNITY
Start: 2020-08-10 | End: 1900-01-01

## 2021-02-18 NOTE — ASSESSMENT
[FreeTextEntry1] : HTN, Hyperlipidemia\par cont Amlodipine 7.5mg and Atorvastatin 10mg-renewed today\par blood work ordered\par \par follow up in one week for lab results\par

## 2021-02-18 NOTE — HISTORY OF PRESENT ILLNESS
[de-identified] : Ms. ANGELO BARCENAS is a 59 year old female, with hx of HTN, hypercholesterolemia, presents for follow up visit, Rx renewals, blood work\par She reports compliance with taking her meds daily. Amlodipine was increased to 10mg in October. Pt says she was taking the 10mg for a while but then went back to taking Amlodipine 7.5mg daily. She checks her BP at home and it's normal\par She is also on Lipitor 10mg for her cholesterol daily and is following a low cholesterol/ low fat diet\par She is doing well\par Denies SOB, chest pain, abdominal pain, N/V/D, leg swelling, headache, dizziness \par Offers no complaints\par

## 2021-02-24 LAB
ALBUMIN SERPL ELPH-MCNC: 4.6 G/DL
ALP BLD-CCNC: 101 U/L
ALT SERPL-CCNC: 30 U/L
ANION GAP SERPL CALC-SCNC: 14 MMOL/L
AST SERPL-CCNC: 26 U/L
BILIRUB SERPL-MCNC: 0.6 MG/DL
BUN SERPL-MCNC: 12 MG/DL
CALCIUM SERPL-MCNC: 10.2 MG/DL
CHLORIDE SERPL-SCNC: 100 MMOL/L
CO2 SERPL-SCNC: 23 MMOL/L
CREAT SERPL-MCNC: 0.66 MG/DL
GLUCOSE SERPL-MCNC: 99 MG/DL
POTASSIUM SERPL-SCNC: 4.5 MMOL/L
PROT SERPL-MCNC: 7.8 G/DL
SODIUM SERPL-SCNC: 137 MMOL/L

## 2021-03-09 LAB
CHOLEST SERPL-MCNC: 207 MG/DL
ESTIMATED AVERAGE GLUCOSE: 120 MG/DL
HBA1C MFR BLD HPLC: 5.8 %
HDLC SERPL-MCNC: 65 MG/DL
LDLC SERPL CALC-MCNC: 114 MG/DL
NONHDLC SERPL-MCNC: 142 MG/DL
SARS-COV-2 IGG SERPL IA-ACNC: 0.07 INDEX
SARS-COV-2 IGG SERPL QL IA: NEGATIVE
TRIGL SERPL-MCNC: 136 MG/DL

## 2021-07-22 ENCOUNTER — APPOINTMENT (OUTPATIENT)
Dept: INTERNAL MEDICINE | Facility: CLINIC | Age: 61
End: 2021-07-22
Payer: COMMERCIAL

## 2021-07-22 VITALS
SYSTOLIC BLOOD PRESSURE: 145 MMHG | HEIGHT: 64 IN | WEIGHT: 198 LBS | DIASTOLIC BLOOD PRESSURE: 79 MMHG | OXYGEN SATURATION: 98 % | BODY MASS INDEX: 33.8 KG/M2 | RESPIRATION RATE: 12 BRPM | TEMPERATURE: 97.8 F | HEART RATE: 69 BPM

## 2021-07-22 DIAGNOSIS — Z23 ENCOUNTER FOR IMMUNIZATION: ICD-10-CM

## 2021-07-22 DIAGNOSIS — E78.5 HYPERLIPIDEMIA, UNSPECIFIED: ICD-10-CM

## 2021-07-22 DIAGNOSIS — Z00.00 ENCOUNTER FOR GENERAL ADULT MEDICAL EXAMINATION W/OUT ABNORMAL FINDINGS: ICD-10-CM

## 2021-07-22 LAB
25(OH)D3 SERPL-MCNC: 46.1 NG/ML
ALBUMIN SERPL ELPH-MCNC: 4.5 G/DL
ALP BLD-CCNC: 79 U/L
ALT SERPL-CCNC: 16 U/L
ANION GAP SERPL CALC-SCNC: 13 MMOL/L
APPEARANCE: CLEAR
AST SERPL-CCNC: 21 U/L
BASOPHILS # BLD AUTO: 0.07 K/UL
BASOPHILS NFR BLD AUTO: 1.2 %
BILIRUB SERPL-MCNC: 0.4 MG/DL
BILIRUBIN URINE: NEGATIVE
BLOOD URINE: NEGATIVE
BUN SERPL-MCNC: 9 MG/DL
CALCIUM SERPL-MCNC: 10.3 MG/DL
CHLORIDE SERPL-SCNC: 102 MMOL/L
CO2 SERPL-SCNC: 24 MMOL/L
COLOR: COLORLESS
COVID-19 NUCLEOCAPSID  GAM ANTIBODY INTERPRETATION: NEGATIVE
COVID-19 SPIKE DOMAIN ANTIBODY INTERPRETATION: NEGATIVE
CREAT SERPL-MCNC: 0.66 MG/DL
EOSINOPHIL # BLD AUTO: 0.24 K/UL
EOSINOPHIL NFR BLD AUTO: 4.2 %
GLUCOSE QUALITATIVE U: NEGATIVE
GLUCOSE SERPL-MCNC: 97 MG/DL
HCT VFR BLD CALC: 45.2 %
HGB BLD-MCNC: 14.7 G/DL
IMM GRANULOCYTES NFR BLD AUTO: 0.4 %
KETONES URINE: NEGATIVE
LEUKOCYTE ESTERASE URINE: NEGATIVE
LYMPHOCYTES # BLD AUTO: 1.4 K/UL
LYMPHOCYTES NFR BLD AUTO: 24.6 %
MAN DIFF?: NORMAL
MCHC RBC-ENTMCNC: 30 PG
MCHC RBC-ENTMCNC: 32.5 GM/DL
MCV RBC AUTO: 92.2 FL
MONOCYTES # BLD AUTO: 0.62 K/UL
MONOCYTES NFR BLD AUTO: 10.9 %
NEUTROPHILS # BLD AUTO: 3.33 K/UL
NEUTROPHILS NFR BLD AUTO: 58.7 %
NITRITE URINE: NEGATIVE
PH URINE: 5.5
PLATELET # BLD AUTO: 339 K/UL
POTASSIUM SERPL-SCNC: 4.5 MMOL/L
PROT SERPL-MCNC: 7.5 G/DL
PROTEIN URINE: NEGATIVE
RBC # BLD: 4.9 M/UL
RBC # FLD: 13.4 %
SARS-COV-2 AB SERPL IA-ACNC: 0.4 U/ML
SARS-COV-2 AB SERPL QL IA: 0.09 INDEX
SODIUM SERPL-SCNC: 139 MMOL/L
SPECIFIC GRAVITY URINE: 1
TSH SERPL-ACNC: 2.24 UIU/ML
UROBILINOGEN URINE: NORMAL
VIT B12 SERPL-MCNC: 252 PG/ML
WBC # FLD AUTO: 5.68 K/UL

## 2021-07-22 PROCEDURE — 99396 PREV VISIT EST AGE 40-64: CPT

## 2021-07-22 NOTE — PHYSICAL EXAM
[No Acute Distress] : no acute distress [Well Nourished] : well nourished [Well Developed] : well developed [Well-Appearing] : well-appearing [Normal Sclera/Conjunctiva] : normal sclera/conjunctiva [EOMI] : extraocular movements intact [Normal Outer Ear/Nose] : the outer ears and nose were normal in appearance [Normal Oropharynx] : the oropharynx was normal [No JVD] : no jugular venous distention [No Lymphadenopathy] : no lymphadenopathy [Supple] : supple [No Respiratory Distress] : no respiratory distress  [No Accessory Muscle Use] : no accessory muscle use [Clear to Auscultation] : lungs were clear to auscultation bilaterally [Normal Rate] : normal rate  [Regular Rhythm] : with a regular rhythm [Normal S1, S2] : normal S1 and S2 [No Murmur] : no murmur heard [Pedal Pulses Present] : the pedal pulses are present [No Edema] : there was no peripheral edema [No Extremity Clubbing/Cyanosis] : no extremity clubbing/cyanosis [Soft] : abdomen soft [Non Tender] : non-tender [Non-distended] : non-distended [No Masses] : no abdominal mass palpated [Normal Bowel Sounds] : normal bowel sounds [Normal Posterior Cervical Nodes] : no posterior cervical lymphadenopathy [Normal Anterior Cervical Nodes] : no anterior cervical lymphadenopathy [No CVA Tenderness] : no CVA  tenderness [No Spinal Tenderness] : no spinal tenderness [No Joint Swelling] : no joint swelling [Grossly Normal Strength/Tone] : grossly normal strength/tone [No Rash] : no rash [Coordination Grossly Intact] : coordination grossly intact [No Focal Deficits] : no focal deficits [Normal Gait] : normal gait [Speech Grossly Normal] : speech grossly normal [Normal Affect] : the affect was normal [Alert and Oriented x3] : oriented to person, place, and time [Normal Insight/Judgement] : insight and judgment were intact

## 2021-07-26 NOTE — HISTORY OF PRESENT ILLNESS
[de-identified] : Ms. ANGELO BARCENAS is a 60 year old female presents for annual physical\par She is doing well. \par She admits not been consistent with diet or exercise but is working on it.\par She reports that she stopped taking the Atorvastatin consistently for the last 3 months because she felt depressed on the medication. Reports taking the Atorvastatin if she feels like shes been over indulging in her meals.\par Denies SOB, chest pain, abdominal pain, N/V/D, leg swelling, headache, dizziness \par Offers no complaints\par

## 2021-07-26 NOTE — HEALTH RISK ASSESSMENT
[Yes] : Yes [Patient reported mammogram was normal] : Patient reported mammogram was normal [Patient reported PAP Smear was normal] : Patient reported PAP Smear was normal [Patient reported bone density results were normal] : Patient reported bone density results were normal [Patient reported colonoscopy was normal] : Patient reported colonoscopy was normal [] :  [# Of Children ___] : has [unfilled] children [] : No [de-identified] : Socially [MammogramDate] : 12/2020 [PapSmearDate] : 12/2020 [BoneDensityDate] : 12/2020 [ColonoscopyDate] : 2016

## 2021-07-29 LAB
CHOLEST SERPL-MCNC: 223 MG/DL
ESTIMATED AVERAGE GLUCOSE: 120 MG/DL
HBA1C MFR BLD HPLC: 5.8 %
HDLC SERPL-MCNC: 53 MG/DL
LDLC SERPL CALC-MCNC: 143 MG/DL
NONHDLC SERPL-MCNC: 170 MG/DL
TRIGL SERPL-MCNC: 136 MG/DL

## 2021-11-10 ENCOUNTER — APPOINTMENT (OUTPATIENT)
Dept: INTERNAL MEDICINE | Facility: CLINIC | Age: 61
End: 2021-11-10

## 2021-12-21 ENCOUNTER — APPOINTMENT (OUTPATIENT)
Dept: INTERNAL MEDICINE | Facility: CLINIC | Age: 61
End: 2021-12-21
Payer: COMMERCIAL

## 2021-12-21 VITALS
WEIGHT: 190 LBS | SYSTOLIC BLOOD PRESSURE: 142 MMHG | RESPIRATION RATE: 12 BRPM | TEMPERATURE: 96.9 F | HEIGHT: 64 IN | OXYGEN SATURATION: 98 % | DIASTOLIC BLOOD PRESSURE: 82 MMHG | BODY MASS INDEX: 32.44 KG/M2 | HEART RATE: 63 BPM

## 2021-12-21 VITALS — DIASTOLIC BLOOD PRESSURE: 86 MMHG | SYSTOLIC BLOOD PRESSURE: 140 MMHG

## 2021-12-21 DIAGNOSIS — I10 ESSENTIAL (PRIMARY) HYPERTENSION: ICD-10-CM

## 2021-12-21 DIAGNOSIS — E78.5 HYPERLIPIDEMIA, UNSPECIFIED: ICD-10-CM

## 2021-12-21 DIAGNOSIS — Z91.89 OTHER SPECIFIED PERSONAL RISK FACTORS, NOT ELSEWHERE CLASSIFIED: ICD-10-CM

## 2021-12-21 PROCEDURE — 99214 OFFICE O/P EST MOD 30 MIN: CPT

## 2021-12-21 NOTE — HISTORY OF PRESENT ILLNESS
[de-identified] : Ms. ANGELO BARCENAS is a 60 year old female presents for follow up visit\par She admits not been consistent with diet or exercise but is working on it.\par She  stopped  Atorvastatin \par Denies SOB, chest pain, abdominal pain, N/V/D, leg swelling, headache, dizziness \par Offers no complaints\par

## 2021-12-21 NOTE — ASSESSMENT
[FreeTextEntry1] : \par \par HTN\par discussed importance of following a low salt diet, weight loss, exercise\par cont Amlodipine 7.5mg daily\par \par HLD\par pt stopped Atorvastatin due to experiencing side effects\par discussed importance of following a low cholesterol/low fat diet\par we'll check Lipid panel today\par \par High risk for diabetes- last HgAA1c was 5.8\par reinforced importance of following a low sugar/low carb diet\par we'll check glucose and HgA1c today\par \par follow up in one week for lab results

## 2021-12-27 LAB
ALBUMIN SERPL ELPH-MCNC: 4.4 G/DL
ALP BLD-CCNC: 81 U/L
ALT SERPL-CCNC: 14 U/L
ANION GAP SERPL CALC-SCNC: 13 MMOL/L
AST SERPL-CCNC: 16 U/L
BILIRUB SERPL-MCNC: 0.3 MG/DL
BUN SERPL-MCNC: 8 MG/DL
CALCIUM SERPL-MCNC: 9.6 MG/DL
CHLORIDE SERPL-SCNC: 102 MMOL/L
CO2 SERPL-SCNC: 24 MMOL/L
CREAT SERPL-MCNC: 0.57 MG/DL
ESTIMATED AVERAGE GLUCOSE: 120 MG/DL
GLUCOSE SERPL-MCNC: 99 MG/DL
HBA1C MFR BLD HPLC: 5.8 %
POTASSIUM SERPL-SCNC: 4.2 MMOL/L
PROT SERPL-MCNC: 7.2 G/DL
SODIUM SERPL-SCNC: 138 MMOL/L

## 2021-12-28 LAB
CHOLEST SERPL-MCNC: 234 MG/DL
HDLC SERPL-MCNC: 53 MG/DL
LDLC SERPL CALC-MCNC: 156 MG/DL
NONHDLC SERPL-MCNC: 181 MG/DL
TRIGL SERPL-MCNC: 125 MG/DL

## 2022-03-07 NOTE — PHYSICAL EXAM
[No Acute Distress] : no acute distress [Well-Appearing] : well-appearing [Well Nourished] : well nourished [Well Developed] : well developed [Normal Sclera/Conjunctiva] : normal sclera/conjunctiva [PERRL] : pupils equal round and reactive to light [EOMI] : extraocular movements intact [Normal Outer Ear/Nose] : the outer ears and nose were normal in appearance [Normal Oropharynx] : the oropharynx was normal [No JVD] : no jugular venous distention [No Lymphadenopathy] : no lymphadenopathy [Supple] : supple [Thyroid Normal, No Nodules] : the thyroid was normal and there were no nodules present [No Respiratory Distress] : no respiratory distress  [No Accessory Muscle Use] : no accessory muscle use [Clear to Auscultation] : lungs were clear to auscultation bilaterally [Regular Rhythm] : with a regular rhythm [Normal Rate] : normal rate  [Normal S1, S2] : normal S1 and S2 [No Murmur] : no murmur heard [Pedal Pulses Present] : the pedal pulses are present [No Edema] : there was no peripheral edema [No Extremity Clubbing/Cyanosis] : no extremity clubbing/cyanosis [Soft] : abdomen soft [Non Tender] : non-tender [Non-distended] : non-distended [No Masses] : no abdominal mass palpated [Normal Bowel Sounds] : normal bowel sounds [No HSM] : no HSM [Normal Posterior Cervical Nodes] : no posterior cervical lymphadenopathy [Normal Anterior Cervical Nodes] : no anterior cervical lymphadenopathy [No CVA Tenderness] : no CVA  tenderness [No Spinal Tenderness] : no spinal tenderness [No Joint Swelling] : no joint swelling [Grossly Normal Strength/Tone] : grossly normal strength/tone [No Rash] : no rash [Coordination Grossly Intact] : coordination grossly intact [Normal Gait] : normal gait [No Focal Deficits] : no focal deficits [Normal Insight/Judgement] : insight and judgment were intact [Normal Affect] : the affect was normal No abnormalities noted

## 2022-09-24 NOTE — PHYSICAL EXAM
Present, accurate, and signed [No Acute Distress] : no acute distress [Well Nourished] : well nourished [Well Developed] : well developed [Well-Appearing] : well-appearing [Normal Sclera/Conjunctiva] : normal sclera/conjunctiva [PERRL] : pupils equal round and reactive to light n/a [EOMI] : extraocular movements intact n/a [Normal Outer Ear/Nose] : the outer ears and nose were normal in appearance [Normal Oropharynx] : the oropharynx was normal n/a [No JVD] : no jugular venous distention [No Lymphadenopathy] : no lymphadenopathy [Supple] : supple [No Respiratory Distress] : no respiratory distress  [No Accessory Muscle Use] : no accessory muscle use [Clear to Auscultation] : lungs were clear to auscultation bilaterally [Normal Rate] : normal rate  [Regular Rhythm] : with a regular rhythm [Normal S1, S2] : normal S1 and S2 [No Murmur] : no murmur heard [Pedal Pulses Present] : the pedal pulses are present [No Edema] : there was no peripheral edema [No Extremity Clubbing/Cyanosis] : no extremity clubbing/cyanosis [Soft] : abdomen soft [Non Tender] : non-tender [Non-distended] : non-distended [Normal Posterior Cervical Nodes] : no posterior cervical lymphadenopathy [Normal Anterior Cervical Nodes] : no anterior cervical lymphadenopathy [No CVA Tenderness] : no CVA  tenderness [No Spinal Tenderness] : no spinal tenderness [No Joint Swelling] : no joint swelling [Grossly Normal Strength/Tone] : grossly normal strength/tone [No Rash] : no rash [Coordination Grossly Intact] : coordination grossly intact [No Focal Deficits] : no focal deficits [Normal Gait] : normal gait [Speech Grossly Normal] : speech grossly normal [Normal Affect] : the affect was normal [Alert and Oriented x3] : oriented to person, place, and time [Normal Insight/Judgement] : insight and judgment were intact

## 2023-04-25 ENCOUNTER — APPOINTMENT (RX ONLY)
Dept: URBAN - METROPOLITAN AREA CLINIC 135 | Facility: CLINIC | Age: 63
Setting detail: DERMATOLOGY
End: 2023-04-25

## 2023-04-25 DIAGNOSIS — L57.8 OTHER SKIN CHANGES DUE TO CHRONIC EXPOSURE TO NONIONIZING RADIATION: ICD-10-CM | Status: INADEQUATELY CONTROLLED

## 2023-04-25 DIAGNOSIS — D18.0 HEMANGIOMA: ICD-10-CM

## 2023-04-25 DIAGNOSIS — I83.9 ASYMPTOMATIC VARICOSE VEINS OF LOWER EXTREMITIES: ICD-10-CM

## 2023-04-25 DIAGNOSIS — L81.4 OTHER MELANIN HYPERPIGMENTATION: ICD-10-CM

## 2023-04-25 DIAGNOSIS — D22 MELANOCYTIC NEVI: ICD-10-CM

## 2023-04-25 DIAGNOSIS — Z85.828 PERSONAL HISTORY OF OTHER MALIGNANT NEOPLASM OF SKIN: ICD-10-CM

## 2023-04-25 DIAGNOSIS — L82.1 OTHER SEBORRHEIC KERATOSIS: ICD-10-CM

## 2023-04-25 DIAGNOSIS — L21.8 OTHER SEBORRHEIC DERMATITIS: ICD-10-CM | Status: INADEQUATELY CONTROLLED

## 2023-04-25 DIAGNOSIS — L57.0 ACTINIC KERATOSIS: ICD-10-CM

## 2023-04-25 DIAGNOSIS — L85.3 XEROSIS CUTIS: ICD-10-CM

## 2023-04-25 PROBLEM — I83.93 ASYMPTOMATIC VARICOSE VEINS OF BILATERAL LOWER EXTREMITIES: Status: ACTIVE | Noted: 2023-04-25

## 2023-04-25 PROBLEM — D23.72 OTHER BENIGN NEOPLASM OF SKIN OF LEFT LOWER LIMB, INCLUDING HIP: Status: ACTIVE | Noted: 2023-04-25

## 2023-04-25 PROBLEM — D23.71 OTHER BENIGN NEOPLASM OF SKIN OF RIGHT LOWER LIMB, INCLUDING HIP: Status: ACTIVE | Noted: 2023-04-25

## 2023-04-25 PROBLEM — D22.5 MELANOCYTIC NEVI OF TRUNK: Status: ACTIVE | Noted: 2023-04-25

## 2023-04-25 PROBLEM — D18.01 HEMANGIOMA OF SKIN AND SUBCUTANEOUS TISSUE: Status: ACTIVE | Noted: 2023-04-25

## 2023-04-25 PROCEDURE — ? PRESCRIPTION

## 2023-04-25 PROCEDURE — ? LIQUID NITROGEN

## 2023-04-25 PROCEDURE — ? PRESCRIPTION MEDICATION MANAGEMENT

## 2023-04-25 PROCEDURE — 17000 DESTRUCT PREMALG LESION: CPT

## 2023-04-25 PROCEDURE — ? COUNSELING

## 2023-04-25 PROCEDURE — 17003 DESTRUCT PREMALG LES 2-14: CPT

## 2023-04-25 PROCEDURE — 99204 OFFICE O/P NEW MOD 45 MIN: CPT | Mod: 25

## 2023-04-25 RX ORDER — KETOCONAZOLE 20 MG/ML
APPLY SHAMPOO, SUSPENSION TOPICAL AS DIRECTED
Qty: 120 | Refills: 3 | Status: ERX | COMMUNITY
Start: 2023-04-25

## 2023-04-25 RX ORDER — TRETIONIN 0.25 MG/G
APPLY CREAM TOPICAL QHS
Qty: 20 | Refills: 6 | Status: ERX | COMMUNITY
Start: 2023-04-25

## 2023-04-25 RX ORDER — BETAMETHASONE DIPROPIONATE 0.5 MG/ML
APPLY LOTION TOPICAL
Qty: 60 | Refills: 5 | Status: ERX | COMMUNITY
Start: 2023-04-25

## 2023-04-25 RX ADMIN — BETAMETHASONE DIPROPIONATE APPLY: 0.5 LOTION TOPICAL at 00:00

## 2023-04-25 RX ADMIN — TRETIONIN APPLY: 0.25 CREAM TOPICAL at 00:00

## 2023-04-25 RX ADMIN — KETOCONAZOLE APPLY: 20 SHAMPOO, SUSPENSION TOPICAL at 00:00

## 2023-04-25 ASSESSMENT — LOCATION DETAILED DESCRIPTION DERM
LOCATION DETAILED: LEFT SUPERIOR UPPER BACK
LOCATION DETAILED: RIGHT MEDIAL UPPER BACK
LOCATION DETAILED: LEFT PROXIMAL PRETIBIAL REGION
LOCATION DETAILED: RIGHT POSTERIOR SHOULDER
LOCATION DETAILED: MIDDLE STERNUM
LOCATION DETAILED: LEFT DISTAL DORSAL FOREARM
LOCATION DETAILED: RIGHT DISTAL PRETIBIAL REGION
LOCATION DETAILED: RIGHT LATERAL SUPERIOR CHEST
LOCATION DETAILED: RIGHT RIB CAGE
LOCATION DETAILED: RIGHT ANTERIOR PROXIMAL THIGH
LOCATION DETAILED: UPPER STERNUM
LOCATION DETAILED: LEFT RIB CAGE
LOCATION DETAILED: RIGHT SUPERIOR LATERAL MALAR CHEEK
LOCATION DETAILED: RIGHT INFERIOR LATERAL MIDBACK
LOCATION DETAILED: RIGHT MEDIAL BUCCAL CHEEK
LOCATION DETAILED: RIGHT PROXIMAL PRETIBIAL REGION
LOCATION DETAILED: LEFT SUPERIOR MEDIAL MIDBACK
LOCATION DETAILED: RIGHT INFERIOR UPPER BACK

## 2023-04-25 ASSESSMENT — LOCATION SIMPLE DESCRIPTION DERM
LOCATION SIMPLE: LEFT FOREARM
LOCATION SIMPLE: ABDOMEN
LOCATION SIMPLE: RIGHT UPPER BACK
LOCATION SIMPLE: RIGHT THIGH
LOCATION SIMPLE: RIGHT LOWER BACK
LOCATION SIMPLE: LEFT LOWER BACK
LOCATION SIMPLE: RIGHT SHOULDER
LOCATION SIMPLE: CHEST
LOCATION SIMPLE: LEFT UPPER BACK
LOCATION SIMPLE: RIGHT CHEEK
LOCATION SIMPLE: LEFT PRETIBIAL REGION
LOCATION SIMPLE: RIGHT PRETIBIAL REGION

## 2023-04-25 ASSESSMENT — LOCATION ZONE DERM
LOCATION ZONE: TRUNK
LOCATION ZONE: ARM
LOCATION ZONE: FACE
LOCATION ZONE: LEG

## 2023-04-25 NOTE — PROCEDURE: PRESCRIPTION MEDICATION MANAGEMENT
Render In Strict Bullet Format?: No
Initiate Treatment: Apply tretinion to face 2-3 times weekly as tolerated
Detail Level: Detailed
Initiate Treatment: Apply Ketoconozole to scalp 2-3 times weekly\\nApply Beta-D drops to scalp once weekly

## 2023-04-25 NOTE — PROCEDURE: LIQUID NITROGEN
Post-Care Instructions: I reviewed with the patient in detail post-care instructions. Patient is to wear sunprotection, and avoid picking at any of the treated lesions. Pt may apply Vaseline to crusted or scabbing areas.
Consent: The patient's consent was obtained including but not limited to risks of crusting, scabbing, blistering, scarring, darker or lighter pigmentary change, recurrence, incomplete removal and infection.
Number Of Freeze-Thaw Cycles: 3 freeze-thaw cycles
Detail Level: Simple
Show Applicator Variable?: Yes
Aperture Size (Optional): C
Duration Of Freeze Thaw-Cycle (Seconds): 3
Render Note In Bullet Format When Appropriate: No

## 2023-04-25 NOTE — PROCEDURE: COUNSELING
Detail Level: Detailed
Detail Level: Simple
Sunscreen Recommendations: Mineral based
Prescription Strength Graduated Compression Stockings Recommendations: The patient was counseled that prescription strength graduated compression stockings should be worn for all waking hours. They will follow up with a venous specialist to monitor graduated compression stocking usage and their symptoms.